# Patient Record
Sex: FEMALE
[De-identification: names, ages, dates, MRNs, and addresses within clinical notes are randomized per-mention and may not be internally consistent; named-entity substitution may affect disease eponyms.]

---

## 2021-08-05 ENCOUNTER — HOSPITAL ENCOUNTER (OUTPATIENT)
Dept: HOSPITAL 90 - RAH | Age: 46
Discharge: HOME | End: 2021-08-05
Attending: OBSTETRICS & GYNECOLOGY
Payer: COMMERCIAL

## 2021-08-05 DIAGNOSIS — G93.89: ICD-10-CM

## 2021-08-05 DIAGNOSIS — E22.1: Primary | ICD-10-CM

## 2021-08-05 PROCEDURE — 70553 MRI BRAIN STEM W/O & W/DYE: CPT

## 2022-04-13 ENCOUNTER — HOSPITAL ENCOUNTER (EMERGENCY)
Dept: HOSPITAL 90 - EDH | Age: 47
Discharge: HOME | End: 2022-04-13
Payer: COMMERCIAL

## 2022-04-13 VITALS — SYSTOLIC BLOOD PRESSURE: 110 MMHG | DIASTOLIC BLOOD PRESSURE: 68 MMHG

## 2022-04-13 VITALS — WEIGHT: 158.95 LBS | HEIGHT: 66 IN | BODY MASS INDEX: 25.55 KG/M2

## 2022-04-13 DIAGNOSIS — I95.1: ICD-10-CM

## 2022-04-13 DIAGNOSIS — N92.0: Primary | ICD-10-CM

## 2022-04-13 DIAGNOSIS — E86.0: ICD-10-CM

## 2022-04-13 LAB
ALBUMIN SERPL-MCNC: 3.7 G/DL (ref 3.5–5)
ALT SERPL-CCNC: 30 U/L (ref 12–78)
AST SERPL-CCNC: 18 U/L (ref 10–37)
BASOPHILS NFR BLD AUTO: 0.9 % (ref 0–5)
BILIRUB SERPL-MCNC: 0.2 MG/DL (ref 0.2–1)
BUN SERPL-MCNC: 10 MG/DL (ref 7–18)
CHLORIDE SERPL-SCNC: 105 MMOL/L (ref 101–111)
CO2 SERPL-SCNC: 29 MMOL/L (ref 21–32)
CREAT SERPL-MCNC: 1 MG/DL (ref 0.5–1.5)
EOSINOPHIL NFR BLD AUTO: 1.4 % (ref 0–8)
ERYTHROCYTE [DISTWIDTH] IN BLOOD BY AUTOMATED COUNT: 15.7 % (ref 11–15.5)
GFR SERPL CREATININE-BSD FRML MDRD: 63 ML/MIN (ref 60–?)
GLUCOSE SERPL-MCNC: 113 MG/DL (ref 70–105)
HCG UR QL: NEGATIVE
HCT VFR BLD AUTO: 36 % (ref 36–48)
LYMPHOCYTES NFR SPEC AUTO: 36.8 % (ref 21–51)
MCH RBC QN AUTO: 29 PG (ref 27–33)
MCHC RBC AUTO-ENTMCNC: 33.9 G/DL (ref 32–36)
MCV RBC AUTO: 85.7 FL (ref 79–99)
MONOCYTES NFR BLD AUTO: 5.3 % (ref 3–13)
NEUTROPHILS NFR BLD AUTO: 55.2 % (ref 40–77)
NRBC BLD MANUAL-RTO: 0 % (ref 0–0.19)
PH UR STRIP: 6.5 [PH] (ref 5–8)
PLATELET # BLD AUTO: 214 K/UL (ref 130–400)
POTASSIUM SERPL-SCNC: 4.1 MMOL/L (ref 3.5–5.1)
PROT SERPL-MCNC: 7.4 G/DL (ref 6–8.3)
RBC # BLD AUTO: 4.2 MIL/UL (ref 4–5.5)
RBC #/AREA URNS HPF: (no result) /HPF (ref 0–1)
SODIUM SERPL-SCNC: 139 MMOL/L (ref 136–145)
SP GR UR STRIP: 1.01 (ref 1–1.03)
UROBILINOGEN UR STRIP-MCNC: 0.2 MG/DL (ref 0.2–1)
WBC # BLD AUTO: 5.7 K/UL (ref 4.8–10.8)
WBC #/AREA URNS HPF: (no result) /HPF (ref 0–1)

## 2022-04-13 PROCEDURE — 81025 URINE PREGNANCY TEST: CPT

## 2022-04-13 PROCEDURE — 81001 URINALYSIS AUTO W/SCOPE: CPT

## 2022-04-13 PROCEDURE — 99283 EMERGENCY DEPT VISIT LOW MDM: CPT

## 2022-04-13 PROCEDURE — 85025 COMPLETE CBC W/AUTO DIFF WBC: CPT

## 2022-04-13 PROCEDURE — 96360 HYDRATION IV INFUSION INIT: CPT

## 2022-04-13 PROCEDURE — 80053 COMPREHEN METABOLIC PANEL: CPT

## 2022-04-13 PROCEDURE — 36415 COLL VENOUS BLD VENIPUNCTURE: CPT

## 2022-10-21 ENCOUNTER — HOSPITAL ENCOUNTER (EMERGENCY)
Dept: HOSPITAL 90 - EDH | Age: 47
Discharge: HOME | End: 2022-10-21
Payer: COMMERCIAL

## 2022-10-21 VITALS — SYSTOLIC BLOOD PRESSURE: 139 MMHG | DIASTOLIC BLOOD PRESSURE: 84 MMHG

## 2022-10-21 VITALS — WEIGHT: 160.94 LBS | HEIGHT: 66 IN | BODY MASS INDEX: 25.86 KG/M2

## 2022-10-21 DIAGNOSIS — F31.9: ICD-10-CM

## 2022-10-21 DIAGNOSIS — Z79.899: ICD-10-CM

## 2022-10-21 DIAGNOSIS — F41.9: ICD-10-CM

## 2022-10-21 DIAGNOSIS — Z00.00: Primary | ICD-10-CM

## 2022-10-21 DIAGNOSIS — I10: ICD-10-CM

## 2022-10-21 DIAGNOSIS — D64.9: ICD-10-CM

## 2022-10-21 LAB
ALBUMIN SERPL-MCNC: 4.1 G/DL (ref 3.5–5)
ALT SERPL-CCNC: 34 U/L (ref 12–78)
APPEARANCE UR: CLEAR
AST SERPL-CCNC: 23 U/L (ref 10–37)
BASOPHILS NFR BLD AUTO: 0.5 % (ref 0–5)
BILIRUB UR QL STRIP: NEGATIVE MG/DL
BUN SERPL-MCNC: 13 MG/DL (ref 7–18)
CHLORIDE SERPL-SCNC: 100 MMOL/L (ref 101–111)
CO2 SERPL-SCNC: 32 MMOL/L (ref 21–32)
COLOR UR: (no result)
CREAT SERPL-MCNC: 0.9 MG/DL (ref 0.5–1.5)
EOSINOPHIL NFR BLD AUTO: 2.3 % (ref 0–8)
ERYTHROCYTE [DISTWIDTH] IN BLOOD BY AUTOMATED COUNT: 15.1 % (ref 11–15.5)
GFR SERPL CREATININE-BSD FRML MDRD: 71 ML/MIN (ref 60–?)
GLUCOSE SERPL-MCNC: 115 MG/DL (ref 70–105)
GLUCOSE UR STRIP-MCNC: NEGATIVE MG/DL
HCT VFR BLD AUTO: 47.8 % (ref 36–48)
HGB UR QL STRIP: NEGATIVE
KETONES UR STRIP-MCNC: NEGATIVE MG/DL
LEUKOCYTE ESTERASE UR QL STRIP: NEGATIVE LEU/UL
LYMPHOCYTES NFR SPEC AUTO: 41.6 % (ref 21–51)
MCH RBC QN AUTO: 28.8 PG (ref 27–33)
MCHC RBC AUTO-ENTMCNC: 33.3 G/DL (ref 32–36)
MCV RBC AUTO: 86.4 FL (ref 79–99)
MONOCYTES NFR BLD AUTO: 7 % (ref 3–13)
NEUTROPHILS NFR BLD AUTO: 48.4 % (ref 40–77)
NITRITE UR QL STRIP: NEGATIVE
NRBC BLD MANUAL-RTO: 0 % (ref 0–0.19)
PH UR STRIP: 7 [PH] (ref 5–8)
PLATELET # BLD AUTO: 252 K/UL (ref 130–400)
POTASSIUM SERPL-SCNC: 3.9 MMOL/L (ref 3.5–5.1)
PROT SERPL-MCNC: 8.4 G/DL (ref 6–8.3)
PROT UR QL STRIP: NEGATIVE MG/DL
RBC # BLD AUTO: 5.53 MIL/UL (ref 4–5.5)
SODIUM SERPL-SCNC: 136 MMOL/L (ref 136–145)
SP GR UR STRIP: 1.01 (ref 1–1.03)
UROBILINOGEN UR STRIP-MCNC: 0.2 MG/DL (ref 0.2–1)
WBC # BLD AUTO: 6.4 K/UL (ref 4.8–10.8)

## 2022-10-21 PROCEDURE — 81003 URINALYSIS AUTO W/O SCOPE: CPT

## 2022-10-21 PROCEDURE — 84484 ASSAY OF TROPONIN QUANT: CPT

## 2022-10-21 PROCEDURE — 36415 COLL VENOUS BLD VENIPUNCTURE: CPT

## 2022-10-21 PROCEDURE — 80053 COMPREHEN METABOLIC PANEL: CPT

## 2022-10-21 PROCEDURE — 85025 COMPLETE CBC W/AUTO DIFF WBC: CPT

## 2022-10-21 PROCEDURE — 93005 ELECTROCARDIOGRAM TRACING: CPT

## 2023-03-19 ENCOUNTER — HOSPITAL ENCOUNTER (EMERGENCY)
Dept: HOSPITAL 90 - EDH | Age: 48
Discharge: HOME | End: 2023-03-19
Payer: COMMERCIAL

## 2023-03-19 VITALS — BODY MASS INDEX: 20.83 KG/M2 | WEIGHT: 125 LBS | HEIGHT: 65 IN

## 2023-03-19 VITALS — DIASTOLIC BLOOD PRESSURE: 74 MMHG | SYSTOLIC BLOOD PRESSURE: 113 MMHG

## 2023-03-19 DIAGNOSIS — N93.8: Primary | ICD-10-CM

## 2023-03-19 DIAGNOSIS — Z79.899: ICD-10-CM

## 2023-03-19 DIAGNOSIS — F32.A: ICD-10-CM

## 2023-03-19 DIAGNOSIS — I10: ICD-10-CM

## 2023-03-19 LAB
ALBUMIN SERPL-MCNC: 3.7 G/DL (ref 3.5–5)
ALT SERPL-CCNC: 16 U/L (ref 12–78)
AST SERPL-CCNC: 14 U/L (ref 10–37)
BASOPHILS NFR BLD AUTO: 0.6 % (ref 0–5)
BUN SERPL-MCNC: 10 MG/DL (ref 7–18)
CHLORIDE SERPL-SCNC: 103 MMOL/L (ref 101–111)
CO2 SERPL-SCNC: 27 MMOL/L (ref 21–32)
CREAT SERPL-MCNC: 0.8 MG/DL (ref 0.5–1.5)
EOSINOPHIL NFR BLD AUTO: 1.5 % (ref 0–8)
ERYTHROCYTE [DISTWIDTH] IN BLOOD BY AUTOMATED COUNT: 19.5 % (ref 11–15.5)
GFR SERPL CREATININE-BSD FRML MDRD: 91 ML/MIN (ref 90–?)
GLUCOSE SERPL-MCNC: 107 MG/DL (ref 70–105)
HCT VFR BLD AUTO: 36.3 % (ref 36–48)
LYMPHOCYTES NFR SPEC AUTO: 39.9 % (ref 21–51)
MCH RBC QN AUTO: 23.5 PG (ref 27–33)
MCHC RBC AUTO-ENTMCNC: 30.6 G/DL (ref 32–36)
MCV RBC AUTO: 76.9 FL (ref 79–99)
MONOCYTES NFR BLD AUTO: 5.6 % (ref 3–13)
NEUTROPHILS NFR BLD AUTO: 52.2 % (ref 40–77)
NRBC BLD MANUAL-RTO: 0 % (ref 0–0.19)
PLATELET # BLD AUTO: 191 K/UL (ref 130–400)
POTASSIUM SERPL-SCNC: 3.8 MMOL/L (ref 3.5–5.1)
PROT SERPL-MCNC: 7 G/DL (ref 6–8.3)
RBC # BLD AUTO: 4.72 MIL/UL (ref 4–5.5)
RBC MORPH BLD: (no result)
SODIUM SERPL-SCNC: 138 MMOL/L (ref 136–145)
WBC # BLD AUTO: 5.4 K/UL (ref 4.8–10.8)

## 2023-03-19 PROCEDURE — 80053 COMPREHEN METABOLIC PANEL: CPT

## 2023-03-19 PROCEDURE — 81025 URINE PREGNANCY TEST: CPT

## 2023-03-19 PROCEDURE — 36415 COLL VENOUS BLD VENIPUNCTURE: CPT

## 2023-03-19 PROCEDURE — 85025 COMPLETE CBC W/AUTO DIFF WBC: CPT

## 2023-08-24 ENCOUNTER — HOSPITAL ENCOUNTER (OUTPATIENT)
Dept: HOSPITAL 90 - RAH | Age: 48
Discharge: HOME | End: 2023-08-24
Attending: INTERNAL MEDICINE
Payer: COMMERCIAL

## 2023-08-24 DIAGNOSIS — Z12.31: Primary | ICD-10-CM

## 2023-08-24 PROCEDURE — 77067 SCR MAMMO BI INCL CAD: CPT

## 2024-08-15 ENCOUNTER — HOSPITAL ENCOUNTER (EMERGENCY)
Dept: HOSPITAL 90 - EDH | Age: 49
Discharge: HOME | End: 2024-08-15
Payer: COMMERCIAL

## 2024-08-15 VITALS
DIASTOLIC BLOOD PRESSURE: 60 MMHG | OXYGEN SATURATION: 98 % | RESPIRATION RATE: 20 BRPM | SYSTOLIC BLOOD PRESSURE: 140 MMHG | HEART RATE: 105 BPM

## 2024-08-15 VITALS — WEIGHT: 149.91 LBS | HEIGHT: 65 IN | BODY MASS INDEX: 24.98 KG/M2

## 2024-08-15 DIAGNOSIS — F41.9: ICD-10-CM

## 2024-08-15 DIAGNOSIS — I10: ICD-10-CM

## 2024-08-15 DIAGNOSIS — Z79.899: ICD-10-CM

## 2024-08-15 DIAGNOSIS — F31.9: Primary | ICD-10-CM

## 2025-01-27 ENCOUNTER — HOSPITAL ENCOUNTER (OUTPATIENT)
Dept: HOSPITAL 90 - RAH | Age: 50
Discharge: HOME | End: 2025-01-27
Attending: OBSTETRICS & GYNECOLOGY
Payer: COMMERCIAL

## 2025-01-27 DIAGNOSIS — R92.333: ICD-10-CM

## 2025-01-27 DIAGNOSIS — Z12.31: Primary | ICD-10-CM

## 2025-01-27 PROCEDURE — 77067 SCR MAMMO BI INCL CAD: CPT

## 2025-01-27 NOTE — [facility]
SCREENING MAMMOGRAM  



REASON: Annual Exam



COMPARISON: 8/24/2023



TECHNIQUE: CC and MLO views of the bilateral breasts were performed.CAD

was performed as well. 



FINDINGS:



 Parenchymal density: The breasts are heterogeneously dense, which may

obscure small masses. 



There are no focal mass lesions. There are no pathologic appearing

calcifications. There is no evidence of architectural distortion or skin

thickening.



IMPRESSION:

Normal screening mammogram



The patient was entered into a reminder system with a target due date

for their next mammogram.



BI-RADS 

CATEGORY 1: NEGATIVE



Recommend monthly self breast exam as well as annual clinical

examination.  



A negative x-ray should not delay biopsy if a dominant or clinically

suspicious mass is present, since 8-10% of cancers are not identified by

mammography.  Dense breasts particularly, may obscure an underlying

neoplasm.  Some of these may be detected clinically and therefore,

clinical examination is an essential part of breast evaluation.

## 2025-03-14 ENCOUNTER — HOSPITAL ENCOUNTER (EMERGENCY)
Dept: HOSPITAL 90 - EDH | Age: 50
Discharge: HOME | End: 2025-03-14
Payer: COMMERCIAL

## 2025-03-14 VITALS
OXYGEN SATURATION: 99 % | SYSTOLIC BLOOD PRESSURE: 127 MMHG | RESPIRATION RATE: 18 BRPM | DIASTOLIC BLOOD PRESSURE: 63 MMHG | HEART RATE: 97 BPM | TEMPERATURE: 98.5 F

## 2025-03-14 VITALS — BODY MASS INDEX: 26.01 KG/M2 | WEIGHT: 156.09 LBS | HEIGHT: 65 IN

## 2025-03-14 DIAGNOSIS — N39.0: ICD-10-CM

## 2025-03-14 DIAGNOSIS — R07.89: Primary | ICD-10-CM

## 2025-03-14 DIAGNOSIS — Z79.899: ICD-10-CM

## 2025-03-14 DIAGNOSIS — E87.1: ICD-10-CM

## 2025-03-14 DIAGNOSIS — F31.9: ICD-10-CM

## 2025-03-14 DIAGNOSIS — F20.9: ICD-10-CM

## 2025-03-14 LAB
APPEARANCE UR: (no result)
BACTERIA URNS QL MICRO: (no result) /HPF
BASOPHILS # BLD AUTO: 0.02 K/UL (ref 0–0.2)
BASOPHILS NFR BLD AUTO: 0.3 % (ref 0–5)
BILIRUB UR QL STRIP: NEGATIVE MG/DL
BNP SERPL-MCNC: 11 PG/ML (ref 0–100)
BUN SERPL-MCNC: 10 MG/DL (ref 7–18)
CASTS URNS QL MICRO: 9 /LPF
CHLORIDE SERPL-SCNC: 97 MMOL/L (ref 101–111)
CK SERPL-CCNC: 44 U/L (ref 21–232)
CO2 SERPL-SCNC: 31 MMOL/L (ref 21–32)
COLOR UR: YELLOW
CREAT SERPL-MCNC: 0.6 MG/DL (ref 0.5–1)
CRYSTALS UR QL AUTO: 1 /HPF
DEPRECATED SQUAMOUS URNS QL MICRO: (no result) /HPF (ref 0–2)
EOSINOPHIL # BLD AUTO: 0.08 K/UL (ref 0–0.7)
EOSINOPHIL NFR BLD AUTO: 1.1 % (ref 0–8)
ERYTHROCYTE [DISTWIDTH] IN BLOOD BY AUTOMATED COUNT: 14.8 % (ref 11–15.5)
GFR SERPL CREATININE-BSD FRML MDRD: 109 ML/MIN (ref 90–?)
GLUCOSE SERPL-MCNC: 118 MG/DL (ref 70–105)
GLUCOSE UR STRIP-MCNC: NEGATIVE MG/DL
HCT VFR BLD AUTO: 38 % (ref 36–48)
HGB UR QL STRIP: NEGATIVE
IMM GRANULOCYTES # BLD: 0.01 K/UL (ref 0–1)
KETONES UR STRIP-MCNC: NEGATIVE MG/DL
LEUKOCYTE ESTERASE UR QL STRIP: 75 LEU/UL
LYMPHOCYTES # SPEC AUTO: 1.9 K/UL (ref 1–4.8)
LYMPHOCYTES NFR SPEC AUTO: 26.6 % (ref 21–51)
MCH RBC QN AUTO: 29.2 PG (ref 27–33)
MCHC RBC AUTO-ENTMCNC: 34.2 G/DL (ref 32–36)
MCV RBC AUTO: 85.4 FL (ref 79–99)
MICRO URNS: YES
MONOCYTES # BLD AUTO: 0.5 K/UL (ref 0.1–1)
MONOCYTES NFR BLD AUTO: 7 % (ref 3–13)
MUCOUS THREADS URNS QL MICRO: (no result) LPF
NEUTROPHILS # BLD AUTO: 4.6 K/UL (ref 1.8–7.7)
NEUTROPHILS NFR BLD AUTO: 64.9 % (ref 40–77)
NITRITE UR QL STRIP: (no result)
NRBC BLD MANUAL-RTO: 0 % (ref 0–0.19)
PH UR STRIP: 7 [PH] (ref 5–8)
PLATELET # BLD AUTO: 188 K/UL (ref 130–400)
POTASSIUM SERPL-SCNC: 3.8 MMOL/L (ref 3.5–5.1)
PROT UR QL STRIP: 10 MG/DL
RBC # BLD AUTO: 4.45 MIL/UL (ref 4–5.5)
RBC #/AREA URNS HPF: (no result) /HPF (ref 0–1)
SODIUM SERPL-SCNC: 130 MMOL/L (ref 136–145)
SP GR UR STRIP: 1.02 (ref 1–1.03)
UROBILINOGEN UR STRIP-MCNC: 0.2 MG/DL (ref 0.2–1)
WBC # BLD AUTO: 7 K/UL (ref 4.8–10.8)
WBC #/AREA URNS HPF: (no result) /HPF (ref 0–1)
YEAST URNS QL MICRO: (no result) /HPF

## 2025-03-14 PROCEDURE — 83880 ASSAY OF NATRIURETIC PEPTIDE: CPT

## 2025-03-14 PROCEDURE — 85025 COMPLETE CBC W/AUTO DIFF WBC: CPT

## 2025-03-14 PROCEDURE — 99285 EMERGENCY DEPT VISIT HI MDM: CPT

## 2025-03-14 PROCEDURE — 84484 ASSAY OF TROPONIN QUANT: CPT

## 2025-03-14 PROCEDURE — 93005 ELECTROCARDIOGRAM TRACING: CPT

## 2025-03-14 PROCEDURE — 82550 ASSAY OF CK (CPK): CPT

## 2025-03-14 PROCEDURE — 81001 URINALYSIS AUTO W/SCOPE: CPT

## 2025-03-14 PROCEDURE — 71045 X-RAY EXAM CHEST 1 VIEW: CPT

## 2025-03-14 PROCEDURE — 87186 SC STD MICRODIL/AGAR DIL: CPT

## 2025-03-14 PROCEDURE — 81025 URINE PREGNANCY TEST: CPT

## 2025-03-14 PROCEDURE — 36415 COLL VENOUS BLD VENIPUNCTURE: CPT

## 2025-03-14 PROCEDURE — 80048 BASIC METABOLIC PNL TOTAL CA: CPT

## 2025-03-14 PROCEDURE — 96372 THER/PROPH/DIAG INJ SC/IM: CPT

## 2025-03-14 PROCEDURE — 87086 URINE CULTURE/COLONY COUNT: CPT

## 2025-03-14 NOTE — EKG
[facility]

                                       

Test Date:    2025               Test Time:    19:58:14

Pat Name:     SUSI QUINTERO          Department:   ED

Patient ID:   [id number]           Room:          

Gender:       F                        Technician:   [id number]

:          1975               Requested By: LAMONT OBRIEN

Order Number: [id number]        Reading MD:   Reji Rahman

                                 Measurements

Intervals                              Axis          

Rate:         76                       P:            49

SD:           155                      QRS:          24

QRSD:         82                       T:            -11

QT:           369                                    

QTc:          415                                    

                           Interpretive Statements

Sinus rhythm

Borderline T abnormalities, diffuse leads

Compared to ECG 10/21/2022 17:06:30

No significant changes

Electronically Signed On 2025 18:35:20 CDT by Reji Rahman



Please click the below link to view image of tracing.

## 2025-03-14 NOTE — ERN
ED Note


History of Present Illness


Stated Complaint:  CHEST PAIN


Chief Complaint:  Multiple Complaints


Time Seen by MD:  19:51


Dictation:


This is a 49-year-old female who presented to the emergency room with complaints

of midsternal chest pain radiating to the chest wall about an hour prior to the 

presentation.  She stated that she felt a pressure and tightness she also has 

diffuse body aches.  No history of any diaphoresis syncope.  She stated that she

has tenderness associated with the pain in the lower chest area mostly 

midsternal and all across.  No nausea vomitings diarrhea hematemesis or melena 

no fever chills or rigors no sniffles cough sputum or hemoptysis


She denied lifting any heavy objects


.


Temperature 98.2 pulse 92 respirations 18 blood pressure 128/70 pulse oximetry 

of 98% on room air





Chronic history of bipolar disorder and schizophrenia


Allergies:  


Coded Allergies:  


     No Known Drug Allergies (Verified  Allergy, Unknown, 12/17/18)


Home Meds


Active Scripts


Nitrofurantoin Monohyd/M-Cryst (Macrobid 100 mg Capsule) 100 Mg Capsule, 1 CAP 

PO BID for 7 Days, #14 CAP 0 Refills


   Prov:LAMONT OBRIEN MD         3/14/25


Reported Medications


Oxcarbazepine (Oxcarbazepine) 150 Mg Tablet, 150 MG PO BID, TAB


   12/17/18


Trazodone HCl (Trazodone HCl) 50 Mg Tablet, 50 MG PO HS, TAB


   12/17/18


Paliperidone Palmitate (Invega Sustenna) 156 Mg/1 Ml Disp.syrin, 156 MG IM 

QWEEK, DIS.SYR


   12/17/18





Past Medical History


Past Medical History:  Bipolar, Schizophrenia


Additional Past Medical Hx:  Menorrhagia


Surgical History:  None


Family History:  Negative


Social History:  Negative


Pregnancy History:  Not Applicable


RN Note Reviewed/Agreed w/PFSH:  Yes





Review of System


Dictation


Constitutional: Negative for fever,chills, and weight loss


Eyes: Negative for injury, pain,redness, and discharge


ENT: Negative for injury,pain or swelling


Cardiovascular:  Positive for chest pain, denies palpitations, and edema


Respiratory: Negative for shortness of breath, cough, and wheezing, 


Abdomen/GI:  Negative for abdominal pain, nausea, vomiting, diarrhea, and 

constipation


Back: Negative for injury and pain


: Negative for injury, bleeding and discharge


MS/Extremity: Negative for injury and deformity


Skin: Negative for rash, and discoloration


Neuro: Negative for headache, weakness, numbness, tingling, and seizure 


Psych: Negative for suicide ideation, homicidal ideation, and hallucinations





Initial Vital Sign


VS





                                   Vital Signs








  Date Time  Temp Pulse Resp B/P (MAP) Pulse Ox O2 Delivery O2 Flow Rate FiO2


 


3/14/25 19:48 98.2 92 18 128/70 98 Room Air  











Physical Exam


Dictation


General: awake, alert, NAD 


Head/Face: Normocephalic, atraumatic


Eyes: PERRL, EOMI, vision at baseline


ENT: oral cavity clear, TMs clear, no signs of infection


Neck: Trachea midline, supple, no nuchal rigidity


Cardiovascular: RRR, normal S1/S2, No MRGs, no JVD tenderness in the lower chest

area to palpation


Respiratory: CTAB, no respiratory distress, No rales or wheezes


Abdomen: Soft, non-tender, non-distended, normal bowel sounds, no guarding or 

rebound.


Skin: Warm, dry, normal turgor, no rash


MS/Extremity: Pulses equal, no cyanosis, neurovascular intact, FROM


Neuro: COAx4, GCS 15, strength 5/5, CN 2-12 intact, normal cerebellar exam, 

normal gait,


Psych: Normal behavior, mood, and affect normal


Extremities-trace edema without any palpable cords, Homans sign is negative





Results (Laboratory/Radiology)


Laboratory/Radiology





Laboratory Tests








Test


 3/14/25


20:07 3/14/25


20:55


 


White Blood Count


 7.0 K/uL


(4.8-10.8) 





 


Red Blood Count


 4.45 MIL/uL


(4.00-5.50) 





 


Hemoglobin


 13.0 g/dL


(12.0-16.0) 





 


Hematocrit


 38.0 % (36-48)


 





 


Mean Corpuscular Volume


 85.4 fL


(79-99) 





 


Mean Corpuscular Hemoglobin


 29.2 pg


(27.0-33.0) 





 


Mean Corpuscular Hemoglobin


Concent 34.2 g/dL


(32.0-36.0) 





 


Red Cell Distribution Width


 14.8 %


(11.0-15.5) 





 


Platelet Count


 188 K/uL


(130-400) 





 


Mean Platelet Volume


 9.1 fL


(7.5-10.5) 





 


Immature Granulocyte % (Auto) 0.1 % (0-1)   


 


Neutrophils (%) (Auto)


 64.9 %


(40.0-77.0) 





 


Lymphocytes (%) (Auto)


 26.6 %


(21.0-51.0) 





 


Monocytes (%) (Auto)


 7.0 %


(3.0-13.0) 





 


Eosinophils (%) (Auto)


 1.1 %


(0.0-8.0) 





 


Basophils (%) (Auto)


 0.3 %


(0.0-5.0) 





 


Neutrophils # (Auto)


 4.6 K/uL


(1.8-7.7) 





 


Lymphocytes # (Auto)


 1.9 K/uL


(1.0-4.8) 





 


Monocytes # (Auto)


 0.5 K/uL


(0.1-1.0) 





 


Eosinophils # (Auto)


 0.08 K/uL


(0.00-0.70) 





 


Basophils # (Auto)


 0.02 K/uL


(0.00-0.20) 





 


Absolute Immature Granulocyte


(auto 0.01 K/uL


(0-1) 





 


Nucleated Red Blood Cells


 0.0 %


(0.0-0.19) 





 


Sodium Level


 130 mmol/L


(136-145)  L 





 


Potassium Level


 3.8 mmol/L


(3.5-5.1) 





 


Chloride Level


 97 mmol/L


(101-111)  L 





 


Carbon Dioxide Level


 31 mmol/L


(21-32) 





 


Blood Urea Nitrogen


 10 mg/dL


(7-18) 





 


Creatinine


 0.6 mg/dL


(0.5-1.0) 





 


Glomerular Filtration Rate


Calc 109 mL/min


(>90) 





 


Random Glucose


 118 mg/dL


()  H 





 


Total Calcium


 9.0 mg/dL


(8.5-10.1) 





 


Total Creatine Kinase


 44 U/L


()  # 





 


Troponin I High Sensitivity


 4.5 ng/L


(4-50) 





 


B-Type Natriuretic Peptide


 11 pg/mL


(0-100) 





 


Urine Color


 


 YELLOW


(YELLOW)


 


Urine Appearance


 


 CLOUDY (CLEAR)


H


 


Urine pH  7.0 (5.0-8.0)  


 


Urine Specific Gravity


 


 1.022


(1.001-1.031)


 


Urine Protein


 


 10 mg/dL


(NEGATIVE)  H


 


Urine Glucose (UA)


 


 NEGATIVE mg/dL


(NEGATIVE)


 


Urine Ketones


 


 NEGATIVE mg/dL


(NEGATIVE)


 


Urine Occult Blood


 


 NEGATIVE


(NEGATIVE)


 


Urine Nitrate


 


 2+ (NEGATIVE)


H


 


Urine Bilirubin


 


 NEGATIVE mg/dL


(NEGATIVE)


 


Urine Urobilinogen


 


 0.2 mg/dL


(0.2-1.0)


 


Urine Leukocyte Esterase


 


 75 Samir/uL


(NEGATIVE)  H


 


Urine RBC


 


 0-1 /HPF (0-1)





 


Urine WBC


 


 26-50 /HPF


(0-1)  H


 


Urine Squamous Epithelial


Cells 


 RARE /HPF


(0-2)


 


Urine Other Crystals (Auto)


 


 1 /HPF (None


Seen)


 


Urine Bacteria


 


 FEW /HPF (None


Seen)


 


Urine Other Casts


 


 9 /LPF (None


Seen)


 


Urine Yeast


 


 RARE /HPF


(None Seen)


 


Urine HCG, Qualitative


 


 NEGATIVE


(NEGATIVE)








Labs Reviewed?:  Yes


EKG Comment:


Twelve lead EKG done on 03/14/2025 at 7:58 p.m. showed a heart rate of 76, MI 

interval 155, QRS 82, QT//415 


Impression normal sinus rhythm with nonspecific ST-T changes no acute ST 

elevations noted.


Interpreted by ER MD Dr. Obrien





ED Course


ED Course





Orders








Procedure Category Date Status





  Time 


 


Vital Signs Per CPOE 3/14/25 Transmitted





Routine  19:52 


 


B-Type Natriuretic LAB 3/14/25 Complete





Peptide  19:52 


 


Chest 1vw RAD 3/14/25 Resulted





  19:52 


 


12 Lead Ekg Tracing- EKG 3/14/25 Complete





Technical  19:52 


 


Oxygen By Nc/Pulse Ox CPOE 3/14/25 Transmitted





  19:52 


 


Maintain Iv CPOE 3/14/25 Transmitted





  19:52 


 


Iv Insertion CPOE 3/14/25 Transmitted





  19:52 


 


Cardiac Monitoring CPOE 3/14/25 Transmitted





  19:52 


 


Pulse Oximetry With CPOE 3/14/25 Transmitted





Vs And Prn  19:52 


 


Cbc With Differential LAB 3/14/25 Complete





  19:52 


 


Activity: Br W/Brp CPOE 3/14/25 Transmitted





With Assist  19:52 


 


Urinalysis Profile LAB 3/14/25 Complete





  19:52 


 


Bedside Troponin-I LAB.ER 3/14/25 In Process





 (Poc)  19:52 


 


Basic Metabolic Panel LAB 3/14/25 Complete





  19:52 


 


Cardiac Panel LAB 3/14/25 Complete





  20:07 


 


Pregnancy,Urine Test LAB 3/14/25 Complete





  20:56 


 


Culture Urine LUIS 3/14/25 In Process





  21:12 


 


Ketorolac PHA 3/14/25 In Process





Tromethamine 15mg/Ml  22:00 








Current Medications








 Medications


  (Trade)  Dose


 Ordered  Sig/Julio


 Route


 PRN Reason  Start Time


 Stop Time Status Last Admin


Dose Admin


 


 Ketorolac


 Tromethamine


  (toRADol)  15 mg  ONCE  ONCE


 IM


   3/14/25 22:00


 3/14/25 22:01   











Vital Signs








  Date Time  Temp Pulse Resp B/P (MAP) Pulse Ox O2 Delivery O2 Flow Rate FiO2


 


3/14/25 19:48 98.2 92 18 128/70 98 Room Air  





We will perform diagnostic labs, imaging and administer medications according to

the patient's complaint.  Once the results are available, will review and 

personally interpreted the labs to rule out any acute life-threatening emergency

the trach require immediate intervention and treatment.  I will then re-evaluate

the patient after treatment and diagnostic exams have return to determine 

whether the patient requires any further testing, can safely be discharged home 

or need further admission to hospital for additional treatment and evaluation.


9:28 p.m. labs reviewed CBC is with a normal limits BNP 7 showed a sodium of 130

chloride 97 bicarb 31 BUN and creatinine are 10 and 0.6 with a glucose of 118.  

Troponins are negative brain natriuretic peptide is normal.


Chest x-ray preliminary that I evaluated does not show any obvious infiltrate p

neumothorax or any acute abnormalities.  Radiology report is pending at this 

time  


9:34 a.m. urine pregnancy test is negative urinalysis is very abnormal with 

positive leuko esterase and increased WBC count.


I have updated the patient and her mother on all the tests so far with a 

reproducible pain and tenderness likely chest pain is related to a 

costochondritis or chest wall pain.


We will discharge her with p.o. antibiotics for the UTI











HEART Score Response (Comments) Value


 


History: Low suspicion (0) 0


 


EKG: Repolarization changes 1


 


Age:                                    45-65yrs (+1) 1


 


Risk Factors: No known risk factors (0) 0


 


Initial Troponin: Normal limit (0) 0


 


HEART Score Risk: Low Risk for MACE (1-3) 


 


Total  2











Medical Decision Making


MDM


MDM: 


Differential diagnosis:  Chest wall pain, gastroesophageal reflux, esophagitis, 

unstable angina, pleurisy


Rationale: Tests considered and ordered secondary to shared decision making 

include:


Previous outside records reviewed: Old ER visits.


Risk of complication and/or morbidity or mortality of patient management: None


Medications-Per medication reconciliation


Need for hospitalization: Patient does not meet criteria for hospitalization.


Need for emergency major/minor surgery: No





There are no social concerns with this patient.





Prescription drug management


Prescriptions will include symptomatic care





Patient's prior external medical records from other ER visits were reviewed by 

me as indicated.  Prior testing and results from previous visits were reviewed. 

Prior tests were taken into account with medical decision making and resource 

utilization, independent historian/historians were used to obtain complete 

medical history.





I independently interpreted the test that were performed, results were reviewed 

by me and considered findings on radiology if ordered.





Medical management and examination interpretation discussions were had by me 

with other qualified healthcare professionals as indicated for the patient's 

care.





Problem List


Problem List:  


(1) UTI (urinary tract infection)


(2) Hyponatremia


(3) Atypical chest pain


(4) Bipolar 1 disorder





DX & DISP


Disposition:  Discharge


Departure


Impression:  


   Primary Impression:  Atypical chest pain


   Additional Impressions:  UTI (urinary tract infection), Hyponatremia, Bipolar

   1 disorder


Condition:  Stable


Scripts


Nitrofurantoin Monohyd/M-Cryst (Macrobid 100 mg Capsule) 100 Mg Capsule


1 CAP PO BID for 7 Days, #14 CAP 0 Refills


   Prov: LAMONT OBRIEN MD         3/14/25





Additional Instructions:  


Patient and the caregiver have been informed of all the diagnostic tests and the

imaging conducted during the today's visit to the emergency room and has 

verbalized understanding of the results I have personally reviewed and 

interpreted all diagnostic exams performed here in the ER today as well as the 

vital signs documented by the nursing staff.  The patient is now being 

discharged to home and should follow up with the primary care physician or the 

specialist as directed by the ER staff.





Follow-up with primary care provider in 1 to 2 days.  Take medications as 

directed here in the emergency room.  Okay to continue home medications unless 

otherwise discussed during your visit in the emergency room today.  Return to 

your nearest emergency room if symptoms worsen or if there is no improvement.  

Call 911 if you need immediate assistance.  Take Tylenol or Motrin 

over-the-counter as needed and if no contraindications are present.  Increase 

oral hydration.  A wound culture or urine culture was ordered here in the 

emergency room department please follow-up with primary care provider and advise

them to get repeat ports from our facility.  If you had any Ace wrap/splints 

that were applied here, please do not remove them until you see your primary 

care or specialty.


Referrals:  


HEATHER RIVERA MD (PCP)











LAMONT OBRIEN MD            Mar 14, 2025 20:40

## 2025-03-14 NOTE — [facility]
CHEST 1VW



HISTORY: Chest pain



COMPARISON: 10/8/2020



FINDINGS: A frontal projection of the chest was obtained. Prominent

interstitial markings are seen with possible superimposed infiltrates. 

The heart is normal in size.  Mild degenerative changes are seen.  No

evidence of aortic calcification is seen.



IMPRESSION:

1. Prominent interstitial markings are seen with possible superimposed

infiltrates.

## 2025-07-30 ENCOUNTER — HOSPITAL ENCOUNTER (EMERGENCY)
Dept: HOSPITAL 90 - EDH | Age: 50
Discharge: HOME | End: 2025-07-30
Payer: COMMERCIAL

## 2025-07-30 VITALS — HEIGHT: 66 IN | WEIGHT: 162.04 LBS | BODY MASS INDEX: 26.04 KG/M2

## 2025-07-30 VITALS
SYSTOLIC BLOOD PRESSURE: 115 MMHG | DIASTOLIC BLOOD PRESSURE: 66 MMHG | HEART RATE: 75 BPM | RESPIRATION RATE: 18 BRPM | TEMPERATURE: 98.1 F | OXYGEN SATURATION: 99 %

## 2025-07-30 DIAGNOSIS — F31.9: ICD-10-CM

## 2025-07-30 DIAGNOSIS — Z79.899: ICD-10-CM

## 2025-07-30 DIAGNOSIS — E78.00: ICD-10-CM

## 2025-07-30 DIAGNOSIS — F20.9: ICD-10-CM

## 2025-07-30 DIAGNOSIS — T78.40XA: Primary | ICD-10-CM

## 2025-07-30 DIAGNOSIS — F41.9: ICD-10-CM
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
## 2025-07-30 PROCEDURE — 99284 EMERGENCY DEPT VISIT MOD MDM: CPT

## 2025-07-30 PROCEDURE — 96374 THER/PROPH/DIAG INJ IV PUSH: CPT

## 2025-07-30 PROCEDURE — 96375 TX/PRO/DX INJ NEW DRUG ADDON: CPT

## 2025-07-30 RX ADMIN — DIPHENHYDRAMINE HYDROCHLORIDE ONE MG: 50 INJECTION INTRAMUSCULAR; INTRAVENOUS at 02:37

## 2025-07-30 RX ADMIN — METHYLPREDNISOLONE SODIUM SUCCINATE ONE MG: 125 INJECTION, POWDER, FOR SOLUTION INTRAMUSCULAR; INTRAVENOUS at 02:37

## 2025-07-30 RX ADMIN — FAMOTIDINE ONE MG: 10 INJECTION INTRAVENOUS at 02:37

## 2025-07-31 ENCOUNTER — HOSPITAL ENCOUNTER (EMERGENCY)
Dept: HOSPITAL 90 - EDH | Age: 50
Discharge: HOME | End: 2025-07-31
Payer: COMMERCIAL

## 2025-07-31 VITALS — WEIGHT: 160.06 LBS | HEIGHT: 66 IN | BODY MASS INDEX: 25.72 KG/M2

## 2025-07-31 VITALS
RESPIRATION RATE: 17 BRPM | TEMPERATURE: 98.7 F | DIASTOLIC BLOOD PRESSURE: 65 MMHG | OXYGEN SATURATION: 99 % | HEART RATE: 89 BPM | SYSTOLIC BLOOD PRESSURE: 125 MMHG

## 2025-07-31 DIAGNOSIS — Z79.899: ICD-10-CM

## 2025-07-31 DIAGNOSIS — F31.9: ICD-10-CM

## 2025-07-31 DIAGNOSIS — Z79.52: ICD-10-CM

## 2025-07-31 DIAGNOSIS — F41.9: ICD-10-CM

## 2025-07-31 DIAGNOSIS — E78.00: ICD-10-CM

## 2025-07-31 DIAGNOSIS — L24.0: Primary | ICD-10-CM

## 2025-07-31 PROCEDURE — 99283 EMERGENCY DEPT VISIT LOW MDM: CPT
